# Patient Record
Sex: MALE | Race: WHITE | URBAN - METROPOLITAN AREA
[De-identification: names, ages, dates, MRNs, and addresses within clinical notes are randomized per-mention and may not be internally consistent; named-entity substitution may affect disease eponyms.]

---

## 2024-01-25 ENCOUNTER — NURSING HOME VISIT (OUTPATIENT)
Dept: POST ACUTE CARE | Facility: EXTERNAL LOCATION | Age: 79
End: 2024-01-25
Payer: MEDICARE

## 2024-01-25 DIAGNOSIS — J44.9 CHRONIC OBSTRUCTIVE PULMONARY DISEASE, UNSPECIFIED COPD TYPE (MULTI): Primary | ICD-10-CM

## 2024-01-25 DIAGNOSIS — I25.118 CORONARY ARTERY DISEASE INVOLVING NATIVE HEART WITH OTHER FORM OF ANGINA PECTORIS, UNSPECIFIED VESSEL OR LESION TYPE (CMS-HCC): ICD-10-CM

## 2024-01-25 DIAGNOSIS — B02.29 POSTHERPETIC NEURALGIA: ICD-10-CM

## 2024-01-25 DIAGNOSIS — I11.9 CARDIOMYOPATHY, HYPERTENSIVE, BENIGN, WITHOUT HEART FAILURE (MULTI): ICD-10-CM

## 2024-01-25 DIAGNOSIS — I48.91 ATRIAL FIBRILLATION, UNSPECIFIED TYPE (MULTI): ICD-10-CM

## 2024-01-25 DIAGNOSIS — F32.4 MAJOR DEPRESSIVE DISORDER IN PARTIAL REMISSION, UNSPECIFIED WHETHER RECURRENT (CMS-HCC): ICD-10-CM

## 2024-01-25 DIAGNOSIS — I43 CARDIOMYOPATHY, HYPERTENSIVE, BENIGN, WITHOUT HEART FAILURE (MULTI): ICD-10-CM

## 2024-01-25 PROCEDURE — 99497 ADVNCD CARE PLAN 30 MIN: CPT | Performed by: EMERGENCY MEDICINE

## 2024-01-25 PROCEDURE — 99306 1ST NF CARE HIGH MDM 50: CPT | Performed by: EMERGENCY MEDICINE

## 2024-01-25 NOTE — LETTER
Patient: Marcos Sanchez  : 1945    Encounter Date: 2024    Marcos Sanchez   78 y.o.  male  @location@            Assessment and Plan:  History and physical  Diagnosis: Laceration of finger of left hand - CLC52-AP S61.219A, Medical, COPD with acute exacerbation - FLB42-OX J44.1, Medical, Acute chest pain - JAF27-SH R07.9, Medical, Postherpetic neuralgia - SAY88-HK B02.29, Medical, Chest pain - PNED [id number], Emergency medicine, Medical, Decreased oxygen level - PNED [id number], Emergency medicine, Medical.      On admission:78-year-old male reportedly came to the ED after being found to the office with O2 saturations of 88%.  Mr. Nixon has increasing shortness of breath and a nonproductive cough.  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  Patient reportedly has not had no improvement with Neurontin.  Patient currently admits to no chest pain.  Patient has a significant history of pulmonary function test that revealed severe emphysema.  Patient denies any diarrhea denies any sick contacts.  Patient denies any abdominal pain.  Patient denies any dizziness.   Shortness of breath or walking within 10 to 15 feet.  Patient to the ED was found to have severe hypoxia.  Patient admits to paroxysmal nocturnal dyspnea.  Patient has not weight gain or weight loss.  Patient denies any lower extremity swelling.        During the hospitalization: Patient evaluated by pulmonology as well as well as pain management.  Patient started on IV steroids and DuoNebs and placed on 2 L nasal cannula oxygen.  Patient had pain management and slowly started gabapentin did not tolerate Lyrica.  Was placed on lidocaine patch and started on narcotics MS Contin 15 mg oral every 8 hours.  Patient did have treatment with PT OT therapy.  He was tried to qualify for possible rehab placement.  Day of discharge lungs clear to station.   Cardiovascular S1 and S2 no S3.  Condition on discharge stable.  Patient was discharged on home oxygen 2 L.   albuterol 0.083% inhal soln= proventil, ventolin 2.5mg/3ml 2.5 mg = 3 mL, PRN, Inhalation, [id number]  Eliquis 5 mg oral tablet 5 mg = 1 tabs, ORAL, BID  gabapentin 300 mg oral capsule 600 mg = 2 caps, ORAL, TID  hydroCHLOROthiazide 25 mg oral tablet 25 mg = 1 tabs, ORAL, DAILY  lidocaine 4% topical film 1 patches, Topical, QHS  Lipitor 40 mg oral tablet 40 mg = 1 tabs, ORAL, DAILY  Medrol Dosepak 4 mg oral tablet 1 packets, ORAL, DAILY  MS Contin 15 mg oral tablet, extended release 15 mg = 1 tabs, ORAL, [id number]  senna (sennosides) 8.6 mg oral tablet 8.6 mg = 1 tabs, ORAL, QHS  Toprol- mg oral tablet, extended release 100 mg = 1 tab(s), ORAL, DAILY  Trelegy Ellipta 100 mcg-62.5 mcg-25 mcg/inh inhalation powder 1 puffs, Inhalation, DAILY  Zetia 10 mg oral tablet 10 mg = 1 tabs, ORAL, DAILY  Zoloft 50 mg oral tablet 50 mg = 1 tabs, ORAL, QHS  :  (Complete Discharge Med Rec prior to selecting ST).    I discussed advanced care planning including the explanation and discussion of advanced directives. If patient does not have current up to date documents, examples and information provided on how to create both living will and power of . Patient was encouraged to work on completing these documents.  Information and advise was also provided on DO NOT RESUSCITATE and patient encouraged to consider this  Patient is not sure about DNR at this time.  CODE STATUS is on file with the facility    Source of history: Nurse, Medical personnel, Medical record, Patient.  History limitation: None.    HPI:  History and physical    Patient is unable to give any detailed history and therefore history is obtained from the chart  No acute complaints voiced by the patient or acute concerns raised by nursing    History of hospitalization-    [facility] Complaint   Shortness of breath and COPD      History of Present Illness    78-year-old male reportedly came to the ED after being found to the office with O2 saturations of 88%.  Mr. Nixon has increasing shortness of breath and a nonproductive cough.  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  Patient reportedly has not had no improvement with Neurontin.  Patient currently admits to no chest pain.  Patient has a significant history of pulmonary function test that revealed severe emphysema.  Patient denies any diarrhea denies any sick contacts.  Patient denies any abdominal pain.  Patient denies any dizziness.   Shortness of breath or walking within 10 to 15 feet.  Patient to the ED was found to have severe hypoxia.  Patient admits to paroxysmal nocturnal dyspnea.  Patient has not weight gain or weight loss.  Patient denies any lower extremity swelling.      Histories   Past Medical History: 1.  COPD  2.  Hypertension  3.  Atrial fibrillation  4.  Hyperlipidemia  5.  Neuropathy secondary hyper zoster   Family History:    Prostate cancer.  Brother  Heart attack.  Father (): onset at 47 .  Lymph node disorder.  Mother ()     Procedure history:    No active procedure history items have been selected or recorded.   Social History        Social & Psychosocial History  Social History  Alcohol    Comment: SHONDA (2014 10:55 - Marielle Aldana RN)    Substance Abuse    Comment: SHONDA (2014 10:56 - Marielle Aldana RN)    Tobacco    Former smoker    Psychosocial History   No active psychosocial history has been recorded  .        Physical Exam:  Vital signs as per nursing/MA documentation were reviewed  General appearance: Alert and in no acute distress  HEENT: Normal Inspection  Neck - Normal Inspection  Respiratory : No respiratory distress. Lungs are clear   Cardiovascular: heart rate normal. No gallop  Back - normal inspection  Skin inspection:Warm  Musculoskeletal : No deformities  Neuro : Limited exam. Baseline    ROS:  Review of symptoms is negative except for what is mentioned in HPI    Results/Data  Records including but not limited to electronic medical records, relevant lab work and imaging from patient's health care facility encounter were reviewed and independently verified      Charting was completed using voice recognition technology and may include unintended errors.    Discussed with patient/family, RN, and NP.      Electronically Signed By: Jass Buckner MD   1/30/24  2:53 PM

## 2024-01-27 ENCOUNTER — LAB REQUISITION (OUTPATIENT)
Dept: LAB | Facility: HOSPITAL | Age: 79
End: 2024-01-27
Payer: MEDICARE

## 2024-01-27 DIAGNOSIS — R79.89 OTHER SPECIFIED ABNORMAL FINDINGS OF BLOOD CHEMISTRY: ICD-10-CM

## 2024-01-27 LAB
ANION GAP SERPL CALC-SCNC: 9 MMOL/L (ref 10–20)
BUN SERPL-MCNC: 27 MG/DL (ref 6–23)
CALCIUM SERPL-MCNC: 8.3 MG/DL (ref 8.6–10.3)
CHLORIDE SERPL-SCNC: 96 MMOL/L (ref 98–107)
CO2 SERPL-SCNC: 36 MMOL/L (ref 21–32)
CREAT SERPL-MCNC: 0.98 MG/DL (ref 0.5–1.3)
EGFRCR SERPLBLD CKD-EPI 2021: 78 ML/MIN/1.73M*2
GLUCOSE SERPL-MCNC: 110 MG/DL (ref 74–99)
POTASSIUM SERPL-SCNC: 3.4 MMOL/L (ref 3.5–5.3)
SODIUM SERPL-SCNC: 138 MMOL/L (ref 136–145)

## 2024-01-27 PROCEDURE — 80048 BASIC METABOLIC PNL TOTAL CA: CPT

## 2024-01-30 PROBLEM — I25.10 CORONARY ARTERY DISEASE INVOLVING NATIVE HEART: Status: ACTIVE | Noted: 2024-01-30

## 2024-01-30 PROBLEM — F32.4 MAJOR DEPRESSIVE DISORDER IN PARTIAL REMISSION (CMS-HCC): Status: ACTIVE | Noted: 2024-01-30

## 2024-01-30 PROBLEM — I43 CARDIOMYOPATHY, HYPERTENSIVE, BENIGN, WITHOUT HEART FAILURE (MULTI): Status: ACTIVE | Noted: 2024-01-30

## 2024-01-30 PROBLEM — B02.29 POSTHERPETIC NEURALGIA: Status: ACTIVE | Noted: 2024-01-30

## 2024-01-30 PROBLEM — I11.9 CARDIOMYOPATHY, HYPERTENSIVE, BENIGN, WITHOUT HEART FAILURE (MULTI): Status: ACTIVE | Noted: 2024-01-30

## 2024-01-30 PROBLEM — J44.9 CHRONIC OBSTRUCTIVE PULMONARY DISEASE (MULTI): Status: ACTIVE | Noted: 2024-01-30

## 2024-01-30 PROBLEM — I48.91 ATRIAL FIBRILLATION (MULTI): Status: ACTIVE | Noted: 2024-01-30

## 2024-01-30 NOTE — PROGRESS NOTES
Marcos Sanchez   78 y.o.  male  @location@            Assessment and Plan:  History and physical  Diagnosis: Laceration of finger of left hand - HUV41-OK S61.219A, Medical, COPD with acute exacerbation - OSW66-LA J44.1, Medical, Acute chest pain - YLT01-WI R07.9, Medical, Postherpetic neuralgia - TJR09-YA B02.29, Medical, Chest pain - PNED [id number], Emergency medicine, Medical, Decreased oxygen level - PNED [id number], Emergency medicine, Medical.      On admission:78-year-old male reportedly came to the ED after being found to the office with O2 saturations of 88%.  Mr. Nixon has increasing shortness of breath and a nonproductive cough.  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  Patient reportedly has not had no improvement with Neurontin.  Patient currently admits to no chest pain.  Patient has a significant history of pulmonary function test that revealed severe emphysema.  Patient denies any diarrhea denies any sick contacts.  Patient denies any abdominal pain.  Patient denies any dizziness.   Shortness of breath or walking within 10 to 15 feet.  Patient to the ED was found to have severe hypoxia.  Patient admits to paroxysmal nocturnal dyspnea.  Patient has not weight gain or weight loss.  Patient denies any lower extremity swelling.        During the hospitalization: Patient evaluated by pulmonology as well as well as pain management.  Patient started on IV steroids and DuoNebs and placed on 2 L nasal cannula oxygen.  Patient had pain management and slowly started gabapentin did not tolerate Lyrica.  Was placed on lidocaine patch and started on narcotics MS Contin 15 mg oral every 8 hours.  Patient did have treatment with PT OT therapy.  He was tried to qualify for possible rehab placement.  Day of discharge lungs clear to station.  Cardiovascular S1 and S2 no S3.  Condition on discharge stable.  Patient was  discharged on home oxygen 2 L.   albuterol 0.083% inhal soln= proventil, ventolin 2.5mg/3ml 2.5 mg = 3 mL, PRN, Inhalation, [id number]  Eliquis 5 mg oral tablet 5 mg = 1 tabs, ORAL, BID  gabapentin 300 mg oral capsule 600 mg = 2 caps, ORAL, TID  hydroCHLOROthiazide 25 mg oral tablet 25 mg = 1 tabs, ORAL, DAILY  lidocaine 4% topical film 1 patches, Topical, QHS  Lipitor 40 mg oral tablet 40 mg = 1 tabs, ORAL, DAILY  Medrol Dosepak 4 mg oral tablet 1 packets, ORAL, DAILY  MS Contin 15 mg oral tablet, extended release 15 mg = 1 tabs, ORAL, [id number]  senna (sennosides) 8.6 mg oral tablet 8.6 mg = 1 tabs, ORAL, QHS  Toprol- mg oral tablet, extended release 100 mg = 1 tab(s), ORAL, DAILY  Trelegy Ellipta 100 mcg-62.5 mcg-25 mcg/inh inhalation powder 1 puffs, Inhalation, DAILY  Zetia 10 mg oral tablet 10 mg = 1 tabs, ORAL, DAILY  Zoloft 50 mg oral tablet 50 mg = 1 tabs, ORAL, QHS  :  (Complete Discharge Med Rec prior to selecting ST).    I discussed advanced care planning including the explanation and discussion of advanced directives. If patient does not have current up to date documents, examples and information provided on how to create both living will and power of . Patient was encouraged to work on completing these documents.  Information and advise was also provided on DO NOT RESUSCITATE and patient encouraged to consider this  Patient is not sure about DNR at this time.  CODE STATUS is on file with the facility    Source of history: Nurse, Medical personnel, Medical record, Patient.  History limitation: None.    HPI:  History and physical    Patient is unable to give any detailed history and therefore history is obtained from the chart  No acute complaints voiced by the patient or acute concerns raised by nursing    History of hospitalization-    [facility] Complaint   Shortness of breath and COPD      History of Present Illness   78-year-old male reportedly came to the ED after being found to the office with  O2 saturations of 88%.  Mr. Nixon has increasing shortness of breath and a nonproductive cough.  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  Patient reportedly has not had no improvement with Neurontin.  Patient currently admits to no chest pain.  Patient has a significant history of pulmonary function test that revealed severe emphysema.  Patient denies any diarrhea denies any sick contacts.  Patient denies any abdominal pain.  Patient denies any dizziness.   Shortness of breath or walking within 10 to 15 feet.  Patient to the ED was found to have severe hypoxia.  Patient admits to paroxysmal nocturnal dyspnea.  Patient has not weight gain or weight loss.  Patient denies any lower extremity swelling.      Histories   Past Medical History: 1.  COPD  2.  Hypertension  3.  Atrial fibrillation  4.  Hyperlipidemia  5.  Neuropathy secondary hyper zoster   Family History:    Prostate cancer.  Brother  Heart attack.  Father (): onset at 47 .  Lymph node disorder.  Mother ()     Procedure history:    No active procedure history items have been selected or recorded.   Social History        Social & Psychosocial History  Social History  Alcohol    Comment: SHONDA (2014 10:55 - Jovan GLEZ, Marielle CLARK)    Substance Abuse    Comment: SHONDA (2014 10:56 - Marielle Aldana RN)    Tobacco    Former smoker    Psychosocial History   No active psychosocial history has been recorded  .        Physical Exam:  Vital signs as per nursing/MA documentation were reviewed  General appearance: Alert and in no acute distress  HEENT: Normal Inspection  Neck - Normal Inspection  Respiratory : No respiratory distress. Lungs are clear   Cardiovascular: heart rate normal. No gallop  Back - normal inspection  Skin inspection:Warm  Musculoskeletal : No deformities  Neuro : Limited exam. Baseline    ROS: Review of symptoms is negative except for what is mentioned in  HPI    Results/Data  Records including but not limited to electronic medical records, relevant lab work and imaging from patient's health care facility encounter were reviewed and independently verified      Charting was completed using voice recognition technology and may include unintended errors.    Discussed with patient/family, RN, and NP.   Statement Selected

## 2024-02-21 ENCOUNTER — NURSING HOME VISIT (OUTPATIENT)
Dept: POST ACUTE CARE | Facility: EXTERNAL LOCATION | Age: 79
End: 2024-02-21
Payer: MEDICARE

## 2024-02-21 DIAGNOSIS — I11.9 CARDIOMYOPATHY, HYPERTENSIVE, BENIGN, WITHOUT HEART FAILURE (MULTI): Primary | ICD-10-CM

## 2024-02-21 DIAGNOSIS — J44.9 CHRONIC OBSTRUCTIVE PULMONARY DISEASE, UNSPECIFIED COPD TYPE (MULTI): ICD-10-CM

## 2024-02-21 DIAGNOSIS — F32.4 MAJOR DEPRESSIVE DISORDER IN PARTIAL REMISSION, UNSPECIFIED WHETHER RECURRENT (CMS-HCC): ICD-10-CM

## 2024-02-21 DIAGNOSIS — I43 CARDIOMYOPATHY, HYPERTENSIVE, BENIGN, WITHOUT HEART FAILURE (MULTI): Primary | ICD-10-CM

## 2024-02-21 DIAGNOSIS — I48.91 ATRIAL FIBRILLATION, UNSPECIFIED TYPE (MULTI): ICD-10-CM

## 2024-02-21 PROCEDURE — 99308 SBSQ NF CARE LOW MDM 20: CPT | Performed by: EMERGENCY MEDICINE

## 2024-02-21 NOTE — LETTER
Patient: Marcos Sanchez  : 1945    Encounter Date: 2024    Marcos Sanchez   78 y.o.  male  @location@            Assessment and Plan:  Diagnosis: Laceration of finger of left hand - YFL56-TY S61.219A, Medical, COPD with acute exacerbation - VSF12-OM J44.1, Medical, Acute chest pain - PQQ19-BX R07.9, Medical, Postherpetic neuralgia - RLQ68-LN B02.29, Medical, Chest pain - PNED [id number], Emergency medicine, Medical, Decreased oxygen level - PNED [id number], Emergency medicine, Medical.      On admission:78-year-old male reportedly came to the ED after being found to the office with O2 saturations of 88%.  Mr. Nixon has increasing shortness of breath and a nonproductive cough.  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  Patient reportedly has not had no improvement with Neurontin.  Patient currently admits to no chest pain.  Patient has a significant history of pulmonary function test that revealed severe emphysema.  Patient denies any diarrhea denies any sick contacts.  Patient denies any abdominal pain.  Patient denies any dizziness.   Shortness of breath or walking within 10 to 15 feet.  Patient to the ED was found to have severe hypoxia.  Patient admits to paroxysmal nocturnal dyspnea.  Patient has not weight gain or weight loss.  Patient denies any lower extremity swelling.        During the hospitalization: Patient evaluated by pulmonology as well as well as pain management.  Patient started on IV steroids and DuoNebs and placed on 2 L nasal cannula oxygen.  Patient had pain management and slowly started gabapentin did not tolerate Lyrica.  Was placed on lidocaine patch and started on narcotics MS Contin 15 mg oral every 8 hours.  Patient did have treatment with PT OT therapy.  He was tried to qualify for possible rehab placement.  Day of discharge lungs clear to station.  Cardiovascular S1 and S2 no S3.   Condition on discharge stable.  Patient was discharged on home oxygen 2 L.   albuterol 0.083% inhal soln= proventil, ventolin 2.5mg/3ml 2.5 mg = 3 mL, PRN, Inhalation, [id number]  Eliquis 5 mg oral tablet 5 mg = 1 tabs, ORAL, BID  gabapentin 300 mg oral capsule 600 mg = 2 caps, ORAL, TID  hydroCHLOROthiazide 25 mg oral tablet 25 mg = 1 tabs, ORAL, DAILY  lidocaine 4% topical film 1 patches, Topical, QHS  Lipitor 40 mg oral tablet 40 mg = 1 tabs, ORAL, DAILY  Medrol Dosepak 4 mg oral tablet 1 packets, ORAL, DAILY  MS Contin 15 mg oral tablet, extended release 15 mg = 1 tabs, ORAL, [id number]  senna (sennosides) 8.6 mg oral tablet 8.6 mg = 1 tabs, ORAL, QHS  Toprol- mg oral tablet, extended release 100 mg = 1 tab(s), ORAL, DAILY  Trelegy Ellipta 100 mcg-62.5 mcg-25 mcg/inh inhalation powder 1 puffs, Inhalation, DAILY  Zetia 10 mg oral tablet 10 mg = 1 tabs, ORAL, DAILY  Zoloft 50 mg oral tablet 50 mg = 1 tabs, ORAL, QHS  :  (Complete Discharge Med Rec prior to selecting ST).    -Fall prevention    -Cognitive engagement     -Monitor and treat blood pressure     -Aggressive decubitus ulcer prevention.     -Bowel and bladder care     -Optimal nutrition and supplementation as needed     -GI  and DVT prophylaxis     -Symptom control     -Ambulation as tolerated     -Will follow    Charting is done using voice recognition software and may contain errors which have not been completely corrected      Source of history: Nurse, Medical personnel, Medical record, Patient.  History limitation: None.    HPI:    Patient is unable to give any detailed history and therefore history is obtained from the chart  No acute complaints voiced by the patient or acute concerns raised by nursing    History of hospitalization-    78-year-old male reportedly came to the ED after being found to the office with O2 saturations of 88%.  Mr. Nixon has increasing shortness of breath and a nonproductive cough.  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  Patient reportedly has not had no improvement with Neurontin.  Patient currently admits to no chest pain.  Patient has a significant history of pulmonary function test that revealed severe emphysema.  Patient denies any diarrhea denies any sick contacts.  Patient denies any abdominal pain.  Patient denies any dizziness.   Shortness of breath or walking within 10 to 15 feet.  Patient to the ED was found to have severe hypoxia.  Patient admits to paroxysmal nocturnal dyspnea.  Patient has not weight gain or weight loss.  Patient denies any lower extremity swelling.      Histories   Past Medical History: 1.  COPD  2.  Hypertension  3.  Atrial fibrillation  4.  Hyperlipidemia  5.  Neuropathy secondary hyper zoster   Family History:    Prostate cancer.  Brother  Heart attack.  Father (): onset at 47 .  Lymph node disorder.  Mother ()     Procedure history:    No active procedure history items have been selected or recorded.   Social History        Social & Psychosocial History  Social History  Alcohol    Comment: SHONDA (2014 10:55 - Marielle Aldana RN)    Substance Abuse    Comment: SHONDA (2014 10:56 - Marielle Aldana RN)    Tobacco    Former smoker    Psychosocial History   No active psychosocial history has been recorded  .        Physical Exam:  Vital signs as per nursing/MA documentation were reviewed  General appearance: Alert and in no acute distress  HEENT: Normal Inspection  Neck - Normal Inspection  Respiratory : No respiratory distress. Lungs are clear   Cardiovascular: heart rate normal. No gallop  Back - normal inspection  Skin inspection:Warm  Musculoskeletal : No deformities  Neuro : Limited exam. Baseline    ROS: Review of symptoms is negative except for what is mentioned in HPI    Results/Data  Records including but not limited to electronic medical records, relevant lab work and imaging from patient's health care facility  encounter were reviewed and independently verified      Charting was completed using voice recognition technology and may include unintended errors.    Discussed with patient/family, RN, and NP.      Electronically Signed By: Jass Buckner MD   2/26/24  4:58 PM

## 2024-02-26 NOTE — PROGRESS NOTES
Marcos Sanchez   78 y.o.  male  @location@            Assessment and Plan:  Diagnosis: Laceration of finger of left hand - ZHR70-XX S61.219A, Medical, COPD with acute exacerbation - JOO67-II J44.1, Medical, Acute chest pain - [id number] R07.9, Medical, Postherpetic neuralgia - OTT20-XW B02.29, Medical, Chest pain - PNED [id number], Emergency medicine, Medical, Decreased oxygen level - PNED [id number], Emergency medicine, Medical.      On admission:78-year-old male reportedly came to the ED after being found to the office with O2 saturations of 88%.  Mr. Nixon has increasing shortness of breath and a nonproductive cough.  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  Patient reportedly has not had no improvement with Neurontin.  Patient currently admits to no chest pain.  Patient has a significant history of pulmonary function test that revealed severe emphysema.  Patient denies any diarrhea denies any sick contacts.  Patient denies any abdominal pain.  Patient denies any dizziness.   Shortness of breath or walking within 10 to 15 feet.  Patient to the ED was found to have severe hypoxia.  Patient admits to paroxysmal nocturnal dyspnea.  Patient has not weight gain or weight loss.  Patient denies any lower extremity swelling.        During the hospitalization: Patient evaluated by pulmonology as well as well as pain management.  Patient started on IV steroids and DuoNebs and placed on 2 L nasal cannula oxygen.  Patient had pain management and slowly started gabapentin did not tolerate Lyrica.  Was placed on lidocaine patch and started on narcotics MS Contin 15 mg oral every 8 hours.  Patient did have treatment with PT OT therapy.  He was tried to qualify for possible rehab placement.  Day of discharge lungs clear to station.  Cardiovascular S1 and S2 no S3.  Condition on discharge stable.  Patient was discharged on home oxygen 2  L.   albuterol 0.083% inhal soln= proventil, ventolin 2.5mg/3ml 2.5 mg = 3 mL, PRN, Inhalation, [id number]  Eliquis 5 mg oral tablet 5 mg = 1 tabs, ORAL, BID  gabapentin 300 mg oral capsule 600 mg = 2 caps, ORAL, TID  hydroCHLOROthiazide 25 mg oral tablet 25 mg = 1 tabs, ORAL, DAILY  lidocaine 4% topical film 1 patches, Topical, QHS  Lipitor 40 mg oral tablet 40 mg = 1 tabs, ORAL, DAILY  Medrol Dosepak 4 mg oral tablet 1 packets, ORAL, DAILY  MS Contin 15 mg oral tablet, extended release 15 mg = 1 tabs, ORAL, [id number]  senna (sennosides) 8.6 mg oral tablet 8.6 mg = 1 tabs, ORAL, QHS  Toprol- mg oral tablet, extended release 100 mg = 1 tab(s), ORAL, DAILY  Trelegy Ellipta 100 mcg-62.5 mcg-25 mcg/inh inhalation powder 1 puffs, Inhalation, DAILY  Zetia 10 mg oral tablet 10 mg = 1 tabs, ORAL, DAILY  Zoloft 50 mg oral tablet 50 mg = 1 tabs, ORAL, QHS  :  (Complete Discharge Med Rec prior to selecting ST).    -Fall prevention    -Cognitive engagement     -Monitor and treat blood pressure     -Aggressive decubitus ulcer prevention.     -Bowel and bladder care     -Optimal nutrition and supplementation as needed     -GI  and DVT prophylaxis     -Symptom control     -Ambulation as tolerated     -Will follow    Charting is done using voice recognition software and may contain errors which have not been completely corrected      Source of history: Nurse, Medical personnel, Medical record, Patient.  History limitation: None.    HPI:    Patient is unable to give any detailed history and therefore history is obtained from the chart  No acute complaints voiced by the patient or acute concerns raised by nursing    History of hospitalization-    78-year-old male reportedly came to the ED after being found to the office with O2 saturations of 88%.  Mr. Nixon has increasing shortness of breath and a nonproductive cough.  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  Patient reportedly has not  had no improvement with Neurontin.  Patient currently admits to no chest pain.  Patient has a significant history of pulmonary function test that revealed severe emphysema.  Patient denies any diarrhea denies any sick contacts.  Patient denies any abdominal pain.  Patient denies any dizziness.   Shortness of breath or walking within 10 to 15 feet.  Patient to the ED was found to have severe hypoxia.  Patient admits to paroxysmal nocturnal dyspnea.  Patient has not weight gain or weight loss.  Patient denies any lower extremity swelling.      Histories   Past Medical History: 1.  COPD  2.  Hypertension  3.  Atrial fibrillation  4.  Hyperlipidemia  5.  Neuropathy secondary hyper zoster   Family History:    Prostate cancer.  Brother  Heart attack.  Father (): onset at 47 .  Lymph node disorder.  Mother ()     Procedure history:    No active procedure history items have been selected or recorded.   Social History        Social & Psychosocial History  Social History  Alcohol    Comment: SHONDA (2014 10:55 - Marielle Aldana RN)    Substance Abuse    Comment: SHONDA (2014 10:56 - Marielle Aldana RN)    Tobacco    Former smoker    Psychosocial History   No active psychosocial history has been recorded  .        Physical Exam:  Vital signs as per nursing/MA documentation were reviewed  General appearance: Alert and in no acute distress  HEENT: Normal Inspection  Neck - Normal Inspection  Respiratory : No respiratory distress. Lungs are clear   Cardiovascular: heart rate normal. No gallop  Back - normal inspection  Skin inspection:Warm  Musculoskeletal : No deformities  Neuro : Limited exam. Baseline    ROS: Review of symptoms is negative except for what is mentioned in HPI    Results/Data  Records including but not limited to electronic medical records, relevant lab work and imaging from patient's health care facility encounter were reviewed and independently verified      Charting was completed  using voice recognition technology and may include unintended errors.    Discussed with patient/family, RN, and NP.

## 2024-04-26 PROCEDURE — 77301 RADIOTHERAPY DOSE PLAN IMRT: CPT | Performed by: RADIOLOGY

## 2024-04-26 PROCEDURE — 77300 RADIATION THERAPY DOSE PLAN: CPT | Performed by: RADIOLOGY

## 2024-04-26 PROCEDURE — 77338 DESIGN MLC DEVICE FOR IMRT: CPT | Performed by: RADIOLOGY

## 2024-04-26 PROCEDURE — 77293 RESPIRATOR MOTION MGMT SIMUL: CPT | Performed by: RADIOLOGY

## 2024-04-29 PROCEDURE — 77014 CHG CT GUIDANCE RADIATION THERAPY FLDS PLACEMENT: CPT | Performed by: RADIOLOGY

## 2024-04-30 PROCEDURE — 77014 CHG CT GUIDANCE RADIATION THERAPY FLDS PLACEMENT: CPT | Performed by: RADIOLOGY

## 2024-05-01 PROCEDURE — 77014 CHG CT GUIDANCE RADIATION THERAPY FLDS PLACEMENT: CPT | Performed by: RADIOLOGY

## 2024-05-01 PROCEDURE — 77427 RADIATION TX MANAGEMENT X5: CPT | Performed by: RADIOLOGY

## 2024-05-02 PROCEDURE — 77014 CHG CT GUIDANCE RADIATION THERAPY FLDS PLACEMENT: CPT | Performed by: RADIOLOGY

## 2024-05-06 PROCEDURE — 77427 RADIATION TX MANAGEMENT X5: CPT | Performed by: RADIOLOGY

## 2024-05-06 PROCEDURE — 77014 CHG CT GUIDANCE RADIATION THERAPY FLDS PLACEMENT: CPT | Performed by: RADIOLOGY

## 2024-05-07 PROCEDURE — 77014 CHG CT GUIDANCE RADIATION THERAPY FLDS PLACEMENT: CPT | Performed by: RADIOLOGY

## 2024-05-09 PROCEDURE — 77014 CHG CT GUIDANCE RADIATION THERAPY FLDS PLACEMENT: CPT | Performed by: RADIOLOGY

## 2024-05-10 PROCEDURE — 77014 CHG CT GUIDANCE RADIATION THERAPY FLDS PLACEMENT: CPT | Performed by: RADIOLOGY

## 2024-05-13 PROCEDURE — 77014 CHG CT GUIDANCE RADIATION THERAPY FLDS PLACEMENT: CPT | Performed by: RADIOLOGY

## 2024-05-15 PROCEDURE — 77427 RADIATION TX MANAGEMENT X5: CPT | Performed by: RADIOLOGY

## 2024-05-15 PROCEDURE — 77014 CHG CT GUIDANCE RADIATION THERAPY FLDS PLACEMENT: CPT | Performed by: RADIOLOGY

## 2024-05-16 DIAGNOSIS — F32.A ANXIETY AND DEPRESSION: Primary | ICD-10-CM

## 2024-05-16 DIAGNOSIS — F41.9 ANXIETY AND DEPRESSION: Primary | ICD-10-CM

## 2024-05-16 PROCEDURE — 77014 CHG CT GUIDANCE RADIATION THERAPY FLDS PLACEMENT: CPT | Performed by: RADIOLOGY

## 2024-05-17 PROCEDURE — 77014 CHG CT GUIDANCE RADIATION THERAPY FLDS PLACEMENT: CPT | Performed by: RADIOLOGY

## 2024-05-17 RX ORDER — SERTRALINE HYDROCHLORIDE 50 MG/1
TABLET, FILM COATED ORAL
Qty: 30 TABLET | Refills: 0 | Status: SHIPPED | OUTPATIENT
Start: 2024-05-17

## 2024-05-20 PROCEDURE — 77427 RADIATION TX MANAGEMENT X5: CPT | Performed by: RADIOLOGY

## 2024-05-20 PROCEDURE — 77014 CHG CT GUIDANCE RADIATION THERAPY FLDS PLACEMENT: CPT | Performed by: RADIOLOGY

## 2024-05-21 PROCEDURE — 77014 CHG CT GUIDANCE RADIATION THERAPY FLDS PLACEMENT: CPT | Performed by: RADIOLOGY

## 2024-05-22 PROCEDURE — 77014 CHG CT GUIDANCE RADIATION THERAPY FLDS PLACEMENT: CPT | Performed by: RADIOLOGY

## 2024-05-23 PROCEDURE — 77014 CHG CT GUIDANCE RADIATION THERAPY FLDS PLACEMENT: CPT | Performed by: RADIOLOGY

## 2024-05-24 PROCEDURE — 77014 CHG CT GUIDANCE RADIATION THERAPY FLDS PLACEMENT: CPT | Performed by: RADIOLOGY

## 2024-05-28 PROCEDURE — 77014 CHG CT GUIDANCE RADIATION THERAPY FLDS PLACEMENT: CPT | Performed by: RADIOLOGY

## 2024-05-29 PROCEDURE — 77014 CHG CT GUIDANCE RADIATION THERAPY FLDS PLACEMENT: CPT | Performed by: RADIOLOGY

## 2024-05-29 PROCEDURE — 77427 RADIATION TX MANAGEMENT X5: CPT | Performed by: RADIOLOGY

## 2024-05-30 PROCEDURE — 77014 CHG CT GUIDANCE RADIATION THERAPY FLDS PLACEMENT: CPT | Performed by: RADIOLOGY

## 2024-05-31 PROCEDURE — 77014 CHG CT GUIDANCE RADIATION THERAPY FLDS PLACEMENT: CPT | Performed by: RADIOLOGY

## 2024-06-03 PROCEDURE — 77014 CHG CT GUIDANCE RADIATION THERAPY FLDS PLACEMENT: CPT | Performed by: RADIOLOGY

## 2024-06-04 PROCEDURE — 77014 CHG CT GUIDANCE RADIATION THERAPY FLDS PLACEMENT: CPT | Performed by: RADIOLOGY

## 2024-06-05 PROCEDURE — 77014 CHG CT GUIDANCE RADIATION THERAPY FLDS PLACEMENT: CPT | Performed by: RADIOLOGY

## 2024-06-05 PROCEDURE — 77427 RADIATION TX MANAGEMENT X5: CPT | Performed by: RADIOLOGY

## 2024-06-06 PROCEDURE — 77014 CHG CT GUIDANCE RADIATION THERAPY FLDS PLACEMENT: CPT | Performed by: RADIOLOGY

## 2024-06-07 PROCEDURE — 77014 CHG CT GUIDANCE RADIATION THERAPY FLDS PLACEMENT: CPT | Performed by: STUDENT IN AN ORGANIZED HEALTH CARE EDUCATION/TRAINING PROGRAM

## 2024-06-10 PROCEDURE — 77014 CHG CT GUIDANCE RADIATION THERAPY FLDS PLACEMENT: CPT | Performed by: RADIOLOGY

## 2024-06-11 PROCEDURE — 77014 CHG CT GUIDANCE RADIATION THERAPY FLDS PLACEMENT: CPT | Performed by: RADIOLOGY
